# Patient Record
Sex: MALE | Race: OTHER | HISPANIC OR LATINO | URBAN - METROPOLITAN AREA
[De-identification: names, ages, dates, MRNs, and addresses within clinical notes are randomized per-mention and may not be internally consistent; named-entity substitution may affect disease eponyms.]

---

## 2017-06-30 ENCOUNTER — EMERGENCY (EMERGENCY)
Facility: HOSPITAL | Age: 57
LOS: 1 days | Discharge: HOME | End: 2017-06-30
Attending: EMERGENCY MEDICINE | Admitting: EMERGENCY MEDICINE
Payer: COMMERCIAL

## 2017-06-30 VITALS
OXYGEN SATURATION: 97 % | RESPIRATION RATE: 20 BRPM | WEIGHT: 182.98 LBS | HEIGHT: 69 IN | HEART RATE: 74 BPM | TEMPERATURE: 98 F | SYSTOLIC BLOOD PRESSURE: 113 MMHG | DIASTOLIC BLOOD PRESSURE: 73 MMHG

## 2017-06-30 LAB
ALBUMIN SERPL ELPH-MCNC: 4.2 G/DL — SIGNIFICANT CHANGE UP (ref 3.3–5.2)
ALP SERPL-CCNC: 93 U/L — SIGNIFICANT CHANGE UP (ref 40–120)
ALT FLD-CCNC: 19 U/L — SIGNIFICANT CHANGE UP
ANION GAP SERPL CALC-SCNC: 14 MMOL/L — SIGNIFICANT CHANGE UP (ref 5–17)
AST SERPL-CCNC: 21 U/L — SIGNIFICANT CHANGE UP
BASOPHILS # BLD AUTO: 0 K/UL — SIGNIFICANT CHANGE UP (ref 0–0.2)
BASOPHILS NFR BLD AUTO: 0.3 % — SIGNIFICANT CHANGE UP (ref 0–2)
BILIRUB SERPL-MCNC: 0.5 MG/DL — SIGNIFICANT CHANGE UP (ref 0.4–2)
BUN SERPL-MCNC: 10 MG/DL — SIGNIFICANT CHANGE UP (ref 8–20)
CALCIUM SERPL-MCNC: 8.9 MG/DL — SIGNIFICANT CHANGE UP (ref 8.6–10.2)
CHLORIDE SERPL-SCNC: 104 MMOL/L — SIGNIFICANT CHANGE UP (ref 98–107)
CO2 SERPL-SCNC: 22 MMOL/L — SIGNIFICANT CHANGE UP (ref 22–29)
CREAT SERPL-MCNC: 0.89 MG/DL — SIGNIFICANT CHANGE UP (ref 0.5–1.3)
EOSINOPHIL # BLD AUTO: 0.3 K/UL — SIGNIFICANT CHANGE UP (ref 0–0.5)
EOSINOPHIL NFR BLD AUTO: 4.4 % — SIGNIFICANT CHANGE UP (ref 0–6)
GLUCOSE SERPL-MCNC: 185 MG/DL — HIGH (ref 70–115)
HCT VFR BLD CALC: 38.6 % — LOW (ref 42–52)
HGB BLD-MCNC: 13.5 G/DL — LOW (ref 14–18)
LYMPHOCYTES # BLD AUTO: 1.6 K/UL — SIGNIFICANT CHANGE UP (ref 1–4.8)
LYMPHOCYTES # BLD AUTO: 25.1 % — SIGNIFICANT CHANGE UP (ref 20–55)
MCHC RBC-ENTMCNC: 30.3 PG — SIGNIFICANT CHANGE UP (ref 27–31)
MCHC RBC-ENTMCNC: 35 G/DL — SIGNIFICANT CHANGE UP (ref 32–36)
MCV RBC AUTO: 86.7 FL — SIGNIFICANT CHANGE UP (ref 80–94)
MONOCYTES # BLD AUTO: 0.4 K/UL — SIGNIFICANT CHANGE UP (ref 0–0.8)
MONOCYTES NFR BLD AUTO: 7 % — SIGNIFICANT CHANGE UP (ref 3–10)
NEUTROPHILS # BLD AUTO: 3.9 K/UL — SIGNIFICANT CHANGE UP (ref 1.8–8)
NEUTROPHILS NFR BLD AUTO: 62.9 % — SIGNIFICANT CHANGE UP (ref 37–73)
PLATELET # BLD AUTO: 241 K/UL — SIGNIFICANT CHANGE UP (ref 150–400)
POTASSIUM SERPL-MCNC: 4.2 MMOL/L — SIGNIFICANT CHANGE UP (ref 3.5–5.3)
POTASSIUM SERPL-SCNC: 4.2 MMOL/L — SIGNIFICANT CHANGE UP (ref 3.5–5.3)
PROT SERPL-MCNC: 6.6 G/DL — SIGNIFICANT CHANGE UP (ref 6.6–8.7)
RBC # BLD: 4.45 M/UL — LOW (ref 4.6–6.2)
RBC # FLD: 13.4 % — SIGNIFICANT CHANGE UP (ref 11–15.6)
SODIUM SERPL-SCNC: 140 MMOL/L — SIGNIFICANT CHANGE UP (ref 135–145)
WBC # BLD: 6.2 K/UL — SIGNIFICANT CHANGE UP (ref 4.8–10.8)
WBC # FLD AUTO: 6.2 K/UL — SIGNIFICANT CHANGE UP (ref 4.8–10.8)

## 2017-06-30 PROCEDURE — 70450 CT HEAD/BRAIN W/O DYE: CPT

## 2017-06-30 PROCEDURE — 80053 COMPREHEN METABOLIC PANEL: CPT

## 2017-06-30 PROCEDURE — 36415 COLL VENOUS BLD VENIPUNCTURE: CPT

## 2017-06-30 PROCEDURE — 85027 COMPLETE CBC AUTOMATED: CPT

## 2017-06-30 PROCEDURE — 99284 EMERGENCY DEPT VISIT MOD MDM: CPT | Mod: 25

## 2017-06-30 PROCEDURE — 96374 THER/PROPH/DIAG INJ IV PUSH: CPT

## 2017-06-30 PROCEDURE — 70450 CT HEAD/BRAIN W/O DYE: CPT | Mod: 26

## 2017-06-30 PROCEDURE — 99284 EMERGENCY DEPT VISIT MOD MDM: CPT

## 2017-06-30 RX ORDER — METOCLOPRAMIDE HCL 10 MG
10 TABLET ORAL ONCE
Qty: 0 | Refills: 0 | Status: COMPLETED | OUTPATIENT
Start: 2017-06-30 | End: 2017-06-30

## 2017-06-30 RX ADMIN — Medication 10 MILLIGRAM(S): at 13:28

## 2017-06-30 NOTE — ED STATDOCS - ATTENDING CONTRIBUTION TO CARE
I, Lisa Yost, performed the initial face to face bedside interview with this patient regarding history of present illness, review of symptoms and relevant past medical, social and family history.  I completed an independent physical examination.  I was the initial provider who evaluated this patient. I have signed out the follow up of any pending tests (i.e. labs, radiological studies) to the ACP.  I have communicated the patient’s plan of care and disposition with the ACP.  The history, relevant review of systems, past medical and surgical history, medical decision making, and physical examination was documented by the scribe in my presence and I attest to the accuracy of the documentation.

## 2017-06-30 NOTE — ED ADULT NURSE NOTE - PAIN: BODY LOCATION
head
Breathing spontaneous and unlabored. Breath sounds clear and equal bilaterally with regular rhythm.

## 2017-06-30 NOTE — ED STATDOCS - MUSCULOSKELETAL, MLM
range of motion is not limited and there is no muscle tenderness. normal gait, no difficulty walking. neck supple.

## 2017-06-30 NOTE — ED STATDOCS - OBJECTIVE STATEMENT
57 y/o M pt with a PMHx of Psoriasis, HTN, Hyperlipidemia and DM presents to the ED c/o headache for 3 weeks. He states that the headache is worse in the morning than in the evening. He reports that he gets headaches sometimes, but not extremely frequently. He also reports that there have been no changes in medications. Woke up at 09:20 (06/30/17) with no pain, and it worsened throughout the day. Denies blurry vision, change in vision, n/v, fever, chills or any other complaints. 57 y/o M pt with a PMHx of Psoriasis, HTN, Hyperlipidemia and DM presents to the ED c/o headache for 3 weeks. He states that the headache is worse in the morning than in the evening. He reports that he gets headaches sometimes, but not extremely frequently. He also reports that there have been no changes in medications. Woke up at 09:20 (06/30/17) with no pain, and it worsened throughout the day. Denies blurry vision, change in vision, n/v, fever, chills, numbness, tingling, weakness, or any other complaints.

## 2017-06-30 NOTE — ED STATDOCS - PROGRESS NOTE DETAILS
Patient presents c/o intermittent ha over past 2 weeks, patient states happens in AM and at work, no visual disturbances, no n/v/d, patient with ha currently.  Patient in room comfortable in NAD.  Gait steady, PERRL, CN 2-12 intact, strength 5/5 bilaterally.  CT ordered by attending along with medication, will follow up and discuss with attending patient states pain has resolved,

## 2017-06-30 NOTE — ED STATDOCS - MEDICAL DECISION MAKING DETAILS
Will get labs, CT scan head to rule out intercranial hemorrhage. Likely out patient neurological follow up.

## 2017-06-30 NOTE — ED ADULT NURSE NOTE - OBJECTIVE STATEMENT
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 HR Regular alert and oriented x 3, LSCTA.

## 2018-01-23 ENCOUNTER — EMERGENCY (EMERGENCY)
Facility: HOSPITAL | Age: 58
LOS: 1 days | Discharge: HOME | End: 2018-01-23
Attending: STUDENT IN AN ORGANIZED HEALTH CARE EDUCATION/TRAINING PROGRAM | Admitting: STUDENT IN AN ORGANIZED HEALTH CARE EDUCATION/TRAINING PROGRAM
Payer: COMMERCIAL

## 2018-01-23 VITALS
DIASTOLIC BLOOD PRESSURE: 79 MMHG | WEIGHT: 179.9 LBS | OXYGEN SATURATION: 99 % | RESPIRATION RATE: 18 BRPM | SYSTOLIC BLOOD PRESSURE: 143 MMHG | TEMPERATURE: 98 F | HEART RATE: 63 BPM | HEIGHT: 69 IN

## 2018-01-23 VITALS
HEART RATE: 64 BPM | DIASTOLIC BLOOD PRESSURE: 75 MMHG | RESPIRATION RATE: 16 BRPM | SYSTOLIC BLOOD PRESSURE: 120 MMHG | OXYGEN SATURATION: 100 %

## 2018-01-23 PROCEDURE — 99284 EMERGENCY DEPT VISIT MOD MDM: CPT | Mod: 25

## 2018-01-23 PROCEDURE — 70450 CT HEAD/BRAIN W/O DYE: CPT

## 2018-01-23 PROCEDURE — 70450 CT HEAD/BRAIN W/O DYE: CPT | Mod: 26

## 2018-01-23 PROCEDURE — 96372 THER/PROPH/DIAG INJ SC/IM: CPT

## 2018-01-23 RX ORDER — OXYCODONE AND ACETAMINOPHEN 5; 325 MG/1; MG/1
1 TABLET ORAL ONCE
Qty: 0 | Refills: 0 | Status: DISCONTINUED | OUTPATIENT
Start: 2018-01-23 | End: 2018-01-23

## 2018-01-23 RX ORDER — KETOROLAC TROMETHAMINE 30 MG/ML
60 SYRINGE (ML) INJECTION ONCE
Qty: 0 | Refills: 0 | Status: DISCONTINUED | OUTPATIENT
Start: 2018-01-23 | End: 2018-01-23

## 2018-01-23 RX ORDER — FOLIC ACID 0.8 MG
0 TABLET ORAL
Qty: 0 | Refills: 0 | COMMUNITY

## 2018-01-23 RX ORDER — AZITHROMYCIN 500 MG/1
0 TABLET, FILM COATED ORAL
Qty: 0 | Refills: 0 | COMMUNITY

## 2018-01-23 RX ORDER — METFORMIN HYDROCHLORIDE 850 MG/1
1 TABLET ORAL
Qty: 0 | Refills: 0 | COMMUNITY

## 2018-01-23 RX ORDER — OMEPRAZOLE 10 MG/1
1 CAPSULE, DELAYED RELEASE ORAL
Qty: 0 | Refills: 0 | COMMUNITY

## 2018-01-23 RX ADMIN — OXYCODONE AND ACETAMINOPHEN 1 TABLET(S): 5; 325 TABLET ORAL at 04:13

## 2018-01-23 RX ADMIN — OXYCODONE AND ACETAMINOPHEN 1 TABLET(S): 5; 325 TABLET ORAL at 06:49

## 2018-01-23 RX ADMIN — Medication 60 MILLIGRAM(S): at 04:13

## 2018-01-23 RX ADMIN — Medication 60 MILLIGRAM(S): at 06:49

## 2018-01-23 NOTE — ED PROVIDER NOTE - OBJECTIVE STATEMENT
58 y/o M pt with hx of DM, HTN, and HLD presents to ED c/o persistent headache x 1 day. pt has the flu x 1 week, was diagnosed last week by Dr. Abdullahi. Headache worsened by movement. diarrhea and slightly dizzy. had numbness tingling but subsided. Took Advil with no relief and took 6 Tylenol with no relief. No hx of migraines. Denies cough and rhinorrhea. NKDA.  PCP: Dr. Abdullahi

## 2018-01-23 NOTE — ED ADULT NURSE NOTE - OBJECTIVE STATEMENT
Pt received in [facility] c/o 24 hours of persistent TAVERAS. Pt states he was diagnosed with the flu 1 week ago by Dr. Abdullahi and placed on medication for a respiratory infection (Azithromycin). Pt states he has had diarrhea for a few days and that his HA has gotten worse over the last day. Pt denies any nausea, vomiting, cough, chest pain, weakness, numbness/tingling, visual disturbances.

## 2018-08-07 NOTE — ED ADULT NURSE NOTE - ATTEMPT TO OOB
no
< from: 12 Lead ECG (08.07.18 @ 14:25) >    Ventricular Rate 116 BPM    Atrial Rate 115 BPM    QRS Duration 70 ms    Q-T Interval 318 ms    ****QTC Calculation(Bezet) 442 ms*****    R Axis -36 degrees    T Axis -36 degrees    Diagnosis Line Atrial fibrillation with rapid ventricular response  Septal infarct , age undetermined  Nonspecific ST - T abnormalities

## 2019-09-18 NOTE — ED ADULT NURSE NOTE - CHIEF COMPLAINT QUOTE
Health Maintenance Due   Topic Date Due   • Pneumococcal Vaccine 0-64 (1 of 1 - PPSV23) 09/21/1986   • Influenza Vaccine (1) 09/01/2019       Patient is due for topics as listed above but is not proceeding with Immunization(s) Influenza and Pneumococcal at this time. Patient will discuss vaccines with patient         c/o headache 2 weeks, sent from pmd for eval

## 2019-11-27 NOTE — ED STATDOCS - NS ED MD EM SELECTION
[FreeTextEntry1] : CKD stage 3 stable with normal electrolytes likely secondary to HTN nephrosclerosis;  however anemia raising suspicion to Paraproteinemic dz. ( although iron def could be separate due to hemorrhoids). \par - send labs for Anemia panel.\par -Protein./cr ratio\par -SPEP/IPEP\par -avoid NSAIDS\par -Continue with Meds for BP ; well controlled\par -gout only prednisone for acute attacks. Allopurinol being managed by Rheum. \par -Walt reviewed \par \par RTC in 4 months 
[id number] Detailed

## 2021-02-03 ENCOUNTER — EMERGENCY (EMERGENCY)
Facility: HOSPITAL | Age: 61
LOS: 1 days | Discharge: HOME | End: 2021-02-03
Payer: COMMERCIAL

## 2021-02-03 ENCOUNTER — TRANSCRIPTION ENCOUNTER (OUTPATIENT)
Age: 61
End: 2021-02-03

## 2021-02-03 VITALS — RESPIRATION RATE: 18 BRPM | HEIGHT: 69 IN | HEART RATE: 92 BPM | OXYGEN SATURATION: 100 % | TEMPERATURE: 98 F

## 2021-02-03 PROBLEM — I10 ESSENTIAL (PRIMARY) HYPERTENSION: Chronic | Status: ACTIVE | Noted: 2017-07-03

## 2021-02-03 PROBLEM — E78.5 HYPERLIPIDEMIA, UNSPECIFIED: Chronic | Status: ACTIVE | Noted: 2017-07-03

## 2021-02-03 PROBLEM — E11.9 TYPE 2 DIABETES MELLITUS WITHOUT COMPLICATIONS: Chronic | Status: ACTIVE | Noted: 2017-07-03

## 2021-02-03 PROBLEM — L40.9 PSORIASIS, UNSPECIFIED: Chronic | Status: ACTIVE | Noted: 2017-07-03

## 2021-02-03 LAB — SARS-COV-2 RNA SPEC QL NAA+PROBE: SIGNIFICANT CHANGE UP

## 2021-02-03 PROCEDURE — 99282 EMERGENCY DEPT VISIT SF MDM: CPT

## 2021-02-03 PROCEDURE — 99283 EMERGENCY DEPT VISIT LOW MDM: CPT

## 2021-02-03 PROCEDURE — U0005: CPT

## 2021-02-03 PROCEDURE — U0003: CPT

## 2021-02-03 NOTE — ED PROVIDER NOTE - OBJECTIVE STATEMENT
COVID ASYMPTOMATIC SWAB  Pt presenting to the ER for COVID-19 testing. Denies fevers chills, loss of taste or smell, URI symptoms, chest pain or shortness of breath, nausea vomiting diarrhea abdominal pain, weakness or fatigue. Eating and drinking normal diet. Normal output. Pt requesting testing at this time.

## 2021-02-03 NOTE — ED PROVIDER NOTE - PATIENT PORTAL LINK FT
You can access the FollowMyHealth Patient Portal offered by [facility] by registering at the following website: [email or web address]. By joining [employer]’s FollowMyHealth portal, you will also be able to view your health information using other applications (apps) compatible with our system.

## 2022-11-04 ENCOUNTER — OFFICE (OUTPATIENT)
Dept: URBAN - METROPOLITAN AREA CLINIC 115 | Facility: CLINIC | Age: 62
Setting detail: OPHTHALMOLOGY
End: 2022-11-04
Payer: COMMERCIAL

## 2022-11-04 DIAGNOSIS — H25.13: ICD-10-CM

## 2022-11-04 DIAGNOSIS — H16.223: ICD-10-CM

## 2022-11-04 DIAGNOSIS — H35.033: ICD-10-CM

## 2022-11-04 DIAGNOSIS — H52.4: ICD-10-CM

## 2022-11-04 DIAGNOSIS — E11.9: ICD-10-CM

## 2022-11-04 DIAGNOSIS — H11.043: ICD-10-CM

## 2022-11-04 PROCEDURE — 92015 DETERMINE REFRACTIVE STATE: CPT | Performed by: OPHTHALMOLOGY

## 2022-11-04 PROCEDURE — 92014 COMPRE OPH EXAM EST PT 1/>: CPT | Performed by: OPHTHALMOLOGY

## 2022-11-04 PROCEDURE — 92202 OPSCPY EXTND ON/MAC DRAW: CPT | Performed by: OPHTHALMOLOGY

## 2022-11-04 ASSESSMENT — REFRACTION_AUTOREFRACTION
OS_AXIS: 099
OD_CYLINDER: -1.50
OS_CYLINDER: -1.00
OD_SPHERE: +0.25
OD_AXIS: 084
OS_SPHERE: PLANO

## 2022-11-04 ASSESSMENT — REFRACTION_MANIFEST
OD_VA1: 20/20
OS_VA1: 20/20
OD_CYLINDER: -1.25
OS_SPHERE: PLANO
OD_ADD: +3.00
OD_SPHERE: +0.25
OS_CYLINDER: -1.00
OS_ADD: +3.00
OU_VA: 20/20
OD_AXIS: 084
OS_AXIS: 099

## 2022-11-04 ASSESSMENT — CORNEAL PTERYGIUM
OD_PTERYGIUM: NASAL 1MM
OS_PTERYGIUM: NASAL 1MM

## 2022-11-04 ASSESSMENT — TONOMETRY
OS_IOP_MMHG: 17
OD_IOP_MMHG: 18

## 2022-11-04 ASSESSMENT — SPHEQUIV_DERIVED
OD_SPHEQUIV: -0.5
OD_SPHEQUIV: -0.375

## 2022-11-04 ASSESSMENT — SUPERFICIAL PUNCTATE KERATITIS (SPK)
OD_SPK: 1+
OS_SPK: 1+

## 2022-11-04 ASSESSMENT — CONFRONTATIONAL VISUAL FIELD TEST (CVF)
OD_FINDINGS: FULL
OS_FINDINGS: FULL

## 2022-11-04 ASSESSMENT — VISUAL ACUITY
OS_BCVA: 20/25
OD_BCVA: 20/20-1

## 2023-01-18 NOTE — ED ADULT TRIAGE NOTE - WEIGHT IN LBS
Alesha Briones was seen and evaluated today in the emergency department over your concern of left ankle pain following a fall  You refused left ankle xray  Please return to the emergency department if you experience worsen or any other signs and symptoms that may be concerning to you  Please follow-up with your primary care doctor within 1 day  All questions were answered prior to discharge  Thank you for choosing [facility] for your care  [phone]

## 2023-02-14 NOTE — ED ADULT NURSE NOTE - NS ED NURSE DC INFO COMPLEXITY
Izzy Song Physician [facility]  CARDIOLOGY [address]  Scheduled Appointment: 02/22/2023    
Straightforward: Basic instructions, no meds, no home treatment

## 2023-03-09 ENCOUNTER — LABORATORY RESULT (OUTPATIENT)
Age: 63
End: 2023-03-09

## 2023-03-09 ENCOUNTER — APPOINTMENT (OUTPATIENT)
Dept: RHEUMATOLOGY | Facility: CLINIC | Age: 63
End: 2023-03-09
Payer: COMMERCIAL

## 2023-03-09 VITALS
HEART RATE: 88 BPM | SYSTOLIC BLOOD PRESSURE: 134 MMHG | DIASTOLIC BLOOD PRESSURE: 84 MMHG | TEMPERATURE: 98.4 F | OXYGEN SATURATION: 98 %

## 2023-03-09 PROCEDURE — 99204 OFFICE O/P NEW MOD 45 MIN: CPT

## 2023-03-09 NOTE — ASSESSMENT
[FreeTextEntry1] : Patient with severe PSA and psoriasis previously treated with remicade and MTX \par \par --patient is to do lab work today \par --call in 3-4 days for results and to start medication \par --will obtain documents from prior rheumatologist \par --will need to consider biologic \par

## 2023-03-09 NOTE — PHYSICAL EXAM
[General Appearance - Well Nourished] : well nourished [General Appearance - Well Developed] : well developed [Sclera] : the sclera and conjunctiva were normal [Hearing Threshold Finger Rub Not [place]] : hearing was normal [Nail Clubbing] : no clubbing  or cyanosis of the fingernails [Motor Tone] : muscle strength and tone were normal [FreeTextEntry1] : psoriasis in legs, arms, back  [Affect] : the affect was normal [Mood] : the mood was normal

## 2023-03-09 NOTE — HISTORY OF PRESENT ILLNESS
[FreeTextEntry1] : 61 yo man with history of DM, HTN, HLD, rhinitis, psoriasis and PSA.  presents for management of psoriasis and PSA.  patient previously followed by DR Quinn James who was treating him  with Remicade and MTX.  Patient last seen by Dr James was in 4/2021.  \par \par Patient states that his psoriasis has worsen and he has significant joint pain and joint swelling.  his has prolong morning stiffness.  he does feel better when he becomes active and states active .  however he sometimes finds it too difficulty to get himself started.  He has left foot sauage digits ( digit 3-5) , ankle swelling which makes it difficult to walk, left wrist swelling and cystic formation over the dorsum hand/wrist.  \par \par he denies any eye issues, oral lesion, GI issues.  No sob/cough or CP.  \par \par PMH: as above\par surgery: appendectomy, rotator cuff surgery to bilateral shoulders \par all: NKDA\par Meds;enalopril, rosuvastatin, cetirizine, methotrexate, piogliotazone \par SH: From , retired use to work in a [employer] doing heavy lifting ,, No smoking or illicit drugs, ++alcohol on the weekends  \par

## 2023-03-23 ENCOUNTER — APPOINTMENT (OUTPATIENT)
Dept: RHEUMATOLOGY | Facility: CLINIC | Age: 63
End: 2023-03-23
Payer: COMMERCIAL

## 2023-03-23 VITALS
OXYGEN SATURATION: 98 % | WEIGHT: 183 LBS | TEMPERATURE: 97.2 F | HEIGHT: 71 IN | DIASTOLIC BLOOD PRESSURE: 84 MMHG | RESPIRATION RATE: 17 BRPM | HEART RATE: 77 BPM | BODY MASS INDEX: 25.62 KG/M2 | SYSTOLIC BLOOD PRESSURE: 160 MMHG

## 2023-03-23 LAB
ALBUMIN SERPL ELPH-MCNC: 4.4 G/DL
ALP BLD-CCNC: 129 U/L
ALT SERPL-CCNC: 14 U/L
ANA SER IF-ACNC: NEGATIVE
ANION GAP SERPL CALC-SCNC: 14 MMOL/L
AST SERPL-CCNC: 15 U/L
BASOPHILS # BLD AUTO: 0.03 K/UL
BASOPHILS NFR BLD AUTO: 0.3 %
BILIRUB SERPL-MCNC: 0.3 MG/DL
BUN SERPL-MCNC: 10 MG/DL
CALCIUM SERPL-MCNC: 10 MG/DL
CCP AB SER IA-ACNC: <8 UNITS
CHLORIDE SERPL-SCNC: 101 MMOL/L
CK SERPL-CCNC: 55 U/L
CO2 SERPL-SCNC: 23 MMOL/L
CREAT SERPL-MCNC: 0.91 MG/DL
CRP SERPL-MCNC: 24 MG/L
EGFR: 95 ML/MIN/1.73M2
EOSINOPHIL # BLD AUTO: 0.22 K/UL
EOSINOPHIL NFR BLD AUTO: 2.4 %
ERYTHROCYTE [SEDIMENTATION RATE] IN BLOOD BY WESTERGREN METHOD: 88 MM/HR
GLUCOSE SERPL-MCNC: 149 MG/DL
HBV CORE IGG+IGM SER QL: NONREACTIVE
HBV SURFACE AB SER QL: NONREACTIVE
HBV SURFACE AG SER QL: NONREACTIVE
HCT VFR BLD CALC: 41 %
HCV AB SER QL: NONREACTIVE
HCV S/CO RATIO: 0.47 S/CO
HGB BLD-MCNC: 13.8 G/DL
HIV1+2 AB SPEC QL IA.RAPID: NONREACTIVE
IMM GRANULOCYTES NFR BLD AUTO: 0.3 %
LYMPHOCYTES # BLD AUTO: 1.91 K/UL
LYMPHOCYTES NFR BLD AUTO: 20.8 %
M TB IFN-G BLD-IMP: NEGATIVE
MAN DIFF?: NORMAL
MCHC RBC-ENTMCNC: 29 PG
MCHC RBC-ENTMCNC: 33.7 GM/DL
MCV RBC AUTO: 86.1 FL
MONOCYTES # BLD AUTO: 0.74 K/UL
MONOCYTES NFR BLD AUTO: 8 %
NEUTROPHILS # BLD AUTO: 6.27 K/UL
NEUTROPHILS NFR BLD AUTO: 68.2 %
PLATELET # BLD AUTO: 356 K/UL
POTASSIUM SERPL-SCNC: 4.8 MMOL/L
PROT SERPL-MCNC: 7.3 G/DL
QUANTIFERON TB PLUS MITOGEN MINUS NIL: >10 IU/ML
QUANTIFERON TB PLUS NIL: 0.02 IU/ML
QUANTIFERON TB PLUS TB1 MINUS NIL: 0 IU/ML
QUANTIFERON TB PLUS TB2 MINUS NIL: 0.01 IU/ML
RBC # BLD: 4.76 M/UL
RBC # FLD: 14.9 %
RF+CCP IGG SER-IMP: NEGATIVE
RHEUMATOID FACT SER QL: <10 IU/ML
SODIUM SERPL-SCNC: 138 MMOL/L
WBC # FLD AUTO: 9.2 K/UL

## 2023-03-23 PROCEDURE — 99214 OFFICE O/P EST MOD 30 MIN: CPT

## 2023-03-23 NOTE — ASSESSMENT
[FreeTextEntry1] : Patient with severe PSA and psoriasis previously treated with remicade and MTX.  Currently in a severe flare  \par \par --start HUmira \par --restart MTX with FA \par --drug monitoring labs prior to next visit \par --xrays on next visit \par

## 2023-03-23 NOTE — HISTORY OF PRESENT ILLNESS
[FreeTextEntry1] : 63 yo man with history of DM, HTN, HLD, rhinitis, psoriasis and PSA.  presents for management of psoriasis and PSA.  patient previously followed by DR Quinn James who was treating him with Remicade and MTX.  Patient last seen by Dr James was in 4/2021.  \par \par Patient states that his psoriasis has worsen and he has significant joint pain and joint swelling.  his has prolong morning stiffness.  he does feel better when he becomes active and states active .  however he sometimes finds it too difficulty to get himself started.  He has left foot sauage digits ( digit 3-5) , ankle swelling which makes it difficult to walk, left wrist swelling and cystic formation over the dorsum hand/wrist.  \par \par he denies any eye issues, oral lesion, GI issues.  No sob/cough or CP.  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

## 2023-03-23 NOTE — PHYSICAL EXAM
[General Appearance - Well Nourished] : well nourished [General Appearance - Well Developed] : well developed [Sclera] : the sclera and conjunctiva were normal [Hearing Threshold Finger Rub Not [place]] : hearing was normal [Nail Clubbing] : no clubbing  or cyanosis of the fingernails [Motor Tone] : muscle strength and tone were normal [FreeTextEntry1] : psoriasis in legs, arms, back  [Affect] : the affect was normal [Mood] : the mood was normal

## 2023-03-31 ENCOUNTER — APPOINTMENT (OUTPATIENT)
Dept: RHEUMATOLOGY | Facility: CLINIC | Age: 63
End: 2023-03-31

## 2023-03-31 VITALS
OXYGEN SATURATION: 96 % | TEMPERATURE: 98.4 F | SYSTOLIC BLOOD PRESSURE: 138 MMHG | RESPIRATION RATE: 18 BRPM | HEART RATE: 85 BPM | DIASTOLIC BLOOD PRESSURE: 77 MMHG

## 2023-04-28 ENCOUNTER — APPOINTMENT (OUTPATIENT)
Dept: RHEUMATOLOGY | Facility: CLINIC | Age: 63
End: 2023-04-28
Payer: COMMERCIAL

## 2023-04-28 VITALS
DIASTOLIC BLOOD PRESSURE: 65 MMHG | HEART RATE: 84 BPM | TEMPERATURE: 98.4 F | OXYGEN SATURATION: 98 % | SYSTOLIC BLOOD PRESSURE: 120 MMHG

## 2023-04-28 PROCEDURE — 99214 OFFICE O/P EST MOD 30 MIN: CPT

## 2023-04-28 RX ORDER — FOLIC ACID 1 MG/1
1 TABLET ORAL DAILY
Qty: 30 | Refills: 3 | Status: ACTIVE | COMMUNITY
Start: 2023-03-23 | End: 1900-01-01

## 2023-04-28 NOTE — HISTORY OF PRESENT ILLNESS
[FreeTextEntry1] : 61 yo man with history of DM, HTN, HLD, alcohol use, rhinitis, psoriasis and PSA.  presents for management of psoriasis and PSA.  patient previously followed by DR Quinn James who was treating him with Remicade and MTX.  Patient last seen by Dr James was in 4/2021.  \par \par On last visit : Patient states that his psoriasis has worsen and he has significant joint pain and joint swelling.  his has prolong morning stiffness.  he does feel better when he becomes active and stays active .  however he sometimes finds it too difficulty to get himself started.  He has left foot sausage digits ( digit 3-5) , ankle swelling which makes it difficult to walk, left wrist swelling and cystic formation over the dorsum hand/wrist.  \par \par TODAY: patient started humira 1 month ago and feels much better.  he is also on MTX and he denies any alcohol use.  PATient states that skin is healing and his joints feel much better.  he denies any back pain or cervical at this time.  No more sauage digits.  denies any eye isses, cough or sob.  \par \par he denies any eye issues, oral lesion, GI issues.  No sob/cough or CP.  \par \par Labs: \par quantiferon/hep serologies/HIV 3/9/2023\par ESR 88\par CRP 24 \par normal cbc/cmp\par negative RF/CCP/HLAB27/LEI\par normal CPK \par \par PMH: as above\par surgery: appendectomy, rotator cuff surgery to bilateral shoulders \par all: NKDA\par Meds;enalopril, rosuvastatin, cetirizine, methotrexate, piogliotazone \par SH: From , retired use to work in a [employer] doing heavy lifting ,, No smoking or illicit drugs, ++alcohol on the weekends  \par

## 2023-04-28 NOTE — PHYSICAL EXAM
[General Appearance - Well Nourished] : well nourished [General Appearance - Well Developed] : well developed [Sclera] : the sclera and conjunctiva were normal [Hearing Threshold Finger Rub Not [place]] : hearing was normal [Nail Clubbing] : no clubbing  or cyanosis of the fingernails [Musculoskeletal - Swelling] : no joint swelling seen [Motor Tone] : muscle strength and tone were normal [FreeTextEntry1] : psoriasis  [Affect] : the affect was normal [Mood] : the mood was normal

## 2023-04-28 NOTE — ASSESSMENT
[FreeTextEntry1] : Patient with severe PSA and psoriasis previously treated with remicade and MTX.  started on HUmira 6 weeks ago and doing much better \par \par --cont HUmira \par --cont MTX with FA \par --drug monitoring labs prior to next visit \par --xrays on next visit \par --triamcinolone  for psoriasis \par --avoid alcohol

## 2023-05-05 ENCOUNTER — OFFICE (OUTPATIENT)
Dept: URBAN - METROPOLITAN AREA CLINIC 115 | Facility: CLINIC | Age: 63
Setting detail: OPHTHALMOLOGY
End: 2023-05-05
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
## 2023-05-05 PROCEDURE — NO SHOW FE NO SHOW FEE: Performed by: OPHTHALMOLOGY

## 2023-06-19 ENCOUNTER — APPOINTMENT (OUTPATIENT)
Dept: GASTROENTEROLOGY | Facility: CLINIC | Age: 63
End: 2023-06-19
Payer: COMMERCIAL

## 2023-06-19 VITALS
OXYGEN SATURATION: 98 % | DIASTOLIC BLOOD PRESSURE: 81 MMHG | WEIGHT: 190 LBS | RESPIRATION RATE: 14 BRPM | SYSTOLIC BLOOD PRESSURE: 147 MMHG | BODY MASS INDEX: 26.6 KG/M2 | HEART RATE: 79 BPM | HEIGHT: 71 IN | TEMPERATURE: 98 F

## 2023-06-19 PROCEDURE — 99204 OFFICE O/P NEW MOD 45 MIN: CPT

## 2023-06-19 RX ORDER — ASPIRIN 81 MG
81 TABLET, DELAYED RELEASE (ENTERIC COATED) ORAL
Refills: 0 | Status: ACTIVE | COMMUNITY

## 2023-06-19 RX ORDER — POLYETHYLENE GLYCOL-3350 AND ELECTROLYTES WITH FLAVOR PACK 240; 5.84; 2.98; 6.72; 22.72 G/278.26G; G/278.26G; G/278.26G; G/278.26G; G/278.26G
240 POWDER, FOR SOLUTION ORAL
Qty: 1 | Refills: 0 | Status: ACTIVE | COMMUNITY
Start: 2023-06-19 | End: 1900-01-01

## 2023-06-19 RX ORDER — ROSUVASTATIN CALCIUM 5 MG/1
5 TABLET, FILM COATED ORAL
Refills: 0 | Status: ACTIVE | COMMUNITY

## 2023-06-19 RX ORDER — ENALAPRIL MALEATE 10 MG/1
10 TABLET ORAL
Refills: 0 | Status: ACTIVE | COMMUNITY

## 2023-06-19 RX ORDER — TRIAMCINOLONE ACETONIDE 1 MG/G
0.1 CREAM TOPICAL 3 TIMES DAILY
Qty: 1 | Refills: 2 | Status: DISCONTINUED | COMMUNITY
Start: 2023-04-28 | End: 2023-06-19

## 2023-06-19 NOTE — ASSESSMENT
[FreeTextEntry1] : ARLEEN QUINTANA is a 62 year old male who presents today with c/o heartburn and acid regurgitation, 2-3 times per week for the last 1 month. Lifestyle modifications discussed - decreased intake of acidic, citrus, spicy, greasy and fried foods, chocolate, caffeine, alcohol, and carbonated beverages, elevate head of bed at bedtime, avoid eating 2-3 hours prior to laying down/sleep, consider eating small, frequent meals. Continue Omeprazole daily. Will also need upper endoscopy to further evaluate.\par \par Pt has a personal history of colon polyps with last colonoscopy being 5 years ago w/[facility]. We will request records from that office to review. Pt will need repeat colonoscopy for continued polyp surveillance.\par \par Endoscopy and Colonoscopy to be scheduled. Endoscopy to r/o gastritis, esophagitis, Andersen's Esophagus or PUD. Colonoscopy to r/o malignancy. I have discussed the indications, risks and benefits of the procedures with patient including missed lesion. Bowel prep instructions discussed at length. Alternatives to the endoscopy and colonoscopy discussed with patient. All questions were answered. The patient agrees to proceed with the endoscopy and colonoscopy. Patient is medically optimized for these procedures. Labs reviewed. Gavilyte prep sent to pharmacy.

## 2023-06-19 NOTE — HISTORY OF PRESENT ILLNESS
[FreeTextEntry1] : ARLEEN QUINTANA is a 62 year old male with a PMH significant for HTN, HLD, Arthritis and Psoriasis.\par \par He presents today with complaints of heartburn and acid regurgitation, 2-3 times per week for the last 1 month. Symptoms are worse at night. Denies abdominal pain, nausea, vomiting or dysphagia. He was started on Omeprazole daily about 2 weeks ago by his PCP which has helped symptoms but not completely resolved. Pt does not recall the dosage she is taking.\par \par Pt reports a personal history of colon polyps. Last colonoscopy was 5  years ago. We do not have those records to review. He has 1 BM/day. Denies abdominal pain, constipation, diarrhea, rectal bleeding, melena, black stools or unintentional weight loss. Denies family history of colon cancer or colon polyps.\par \par Denies any significant pulmonary or cardiac conditions.

## 2023-06-19 NOTE — PHYSICAL EXAM

## 2023-06-19 NOTE — REASON FOR VISIT
[Consultation] : a consultation visit [[employer] ] : provided by [employer]   [Time Spent: ____ minutes] : Total time spent using  services: [unfilled] minutes. The patient's primary language is not English thus required  services. [FreeTextEntry1] : heartburn, history of colon polyps  [Interpreters_IDNumber] : [id number] [TWNoteComboBox1] : [place]

## 2023-06-30 ENCOUNTER — APPOINTMENT (OUTPATIENT)
Dept: RHEUMATOLOGY | Facility: CLINIC | Age: 63
End: 2023-06-30

## 2023-08-07 ENCOUNTER — TRANSCRIPTION ENCOUNTER (OUTPATIENT)
Age: 63
End: 2023-08-07

## 2023-08-07 RX ORDER — POLYETHYLENE GLYCOL-3350 AND ELECTROLYTES WITH FLAVOR PACK 240; 5.84; 2.98; 6.72; 22.72 G/278.26G; G/278.26G; G/278.26G; G/278.26G; G/278.26G
240 POWDER, FOR SOLUTION ORAL
Qty: 1 | Refills: 0 | Status: ACTIVE | COMMUNITY
Start: 2023-08-07 | End: 1900-01-01

## 2023-08-08 ENCOUNTER — OUTPATIENT (OUTPATIENT)
Dept: OUTPATIENT SERVICES | Facility: HOSPITAL | Age: 63
LOS: 1 days | End: 2023-08-08
Payer: COMMERCIAL

## 2023-08-08 ENCOUNTER — RESULT REVIEW (OUTPATIENT)
Age: 63
End: 2023-08-08

## 2023-08-08 ENCOUNTER — APPOINTMENT (OUTPATIENT)
Dept: GASTROENTEROLOGY | Facility: GI CENTER | Age: 63
End: 2023-08-08
Payer: COMMERCIAL

## 2023-08-08 DIAGNOSIS — K21.9 GASTRO-ESOPHAGEAL REFLUX DISEASE W/OUT ESOPHAGITIS: ICD-10-CM

## 2023-08-08 DIAGNOSIS — K21.9 GASTRO-ESOPHAGEAL REFLUX DISEASE WITHOUT ESOPHAGITIS: ICD-10-CM

## 2023-08-08 DIAGNOSIS — L40.9 PSORIASIS, UNSPECIFIED: ICD-10-CM

## 2023-08-08 DIAGNOSIS — Z86.010 PERSONAL HISTORY OF COLONIC POLYPS: ICD-10-CM

## 2023-08-08 LAB — GLUCOSE BLDC GLUCOMTR-MCNC: 125 MG/DL — HIGH (ref 70–99)

## 2023-08-08 PROCEDURE — G0105: CPT

## 2023-08-08 PROCEDURE — 88342 IMHCHEM/IMCYTCHM 1ST ANTB: CPT

## 2023-08-08 PROCEDURE — 43239 EGD BIOPSY SINGLE/MULTIPLE: CPT

## 2023-08-08 PROCEDURE — 45378 DIAGNOSTIC COLONOSCOPY: CPT | Mod: 33,59

## 2023-08-08 PROCEDURE — 88312 SPECIAL STAINS GROUP 1: CPT | Mod: 26

## 2023-08-08 PROCEDURE — 88305 TISSUE EXAM BY PATHOLOGIST: CPT | Mod: 26

## 2023-08-08 PROCEDURE — 88342 IMHCHEM/IMCYTCHM 1ST ANTB: CPT | Mod: 26

## 2023-08-08 PROCEDURE — 82962 GLUCOSE BLOOD TEST: CPT

## 2023-08-08 PROCEDURE — 88312 SPECIAL STAINS GROUP 1: CPT

## 2023-08-08 PROCEDURE — 88305 TISSUE EXAM BY PATHOLOGIST: CPT

## 2023-08-08 RX ORDER — OMEPRAZOLE 40 MG/1
40 CAPSULE, DELAYED RELEASE ORAL
Qty: 30 | Refills: 5 | Status: ACTIVE | COMMUNITY
Start: 2023-08-08 | End: 1900-01-01

## 2023-08-08 NOTE — ASSESSMENT
[FreeTextEntry1] : The previous encounters have been reviewed and there are no significant changes other those those items which were stated above. The patient's current medical status is stable for the procedure(s) to be performed today.

## 2023-08-08 NOTE — PHYSICAL EXAM

## 2023-08-11 LAB — SURGICAL PATHOLOGY STUDY: SIGNIFICANT CHANGE UP

## 2023-12-11 ENCOUNTER — APPOINTMENT (OUTPATIENT)
Dept: RHEUMATOLOGY | Facility: CLINIC | Age: 63
End: 2023-12-11

## 2024-03-09 ENCOUNTER — EMERGENCY (EMERGENCY)
Facility: HOSPITAL | Age: 64
LOS: 1 days | Discharge: HOME | End: 2024-03-09
Attending: EMERGENCY MEDICINE
Payer: COMMERCIAL

## 2024-03-09 VITALS
DIASTOLIC BLOOD PRESSURE: 79 MMHG | SYSTOLIC BLOOD PRESSURE: 126 MMHG | OXYGEN SATURATION: 99 % | HEART RATE: 98 BPM | RESPIRATION RATE: 19 BRPM | TEMPERATURE: 98 F

## 2024-03-09 VITALS
RESPIRATION RATE: 18 BRPM | DIASTOLIC BLOOD PRESSURE: 92 MMHG | TEMPERATURE: 98 F | SYSTOLIC BLOOD PRESSURE: 137 MMHG | OXYGEN SATURATION: 98 % | HEART RATE: 112 BPM | WEIGHT: 159.39 LBS

## 2024-03-09 LAB
ALBUMIN SERPL ELPH-MCNC: 4.7 G/DL — SIGNIFICANT CHANGE UP (ref 3.3–5.2)
ALP SERPL-CCNC: 122 U/L — HIGH (ref 40–120)
ALT FLD-CCNC: 35 U/L — SIGNIFICANT CHANGE UP
ANION GAP SERPL CALC-SCNC: 20 MMOL/L — HIGH (ref 5–17)
AST SERPL-CCNC: 28 U/L — SIGNIFICANT CHANGE UP
BASOPHILS # BLD AUTO: 0.02 K/UL — SIGNIFICANT CHANGE UP (ref 0–0.2)
BASOPHILS NFR BLD AUTO: 0.2 % — SIGNIFICANT CHANGE UP (ref 0–2)
BILIRUB SERPL-MCNC: 0.5 MG/DL — SIGNIFICANT CHANGE UP (ref 0.4–2)
BUN SERPL-MCNC: 16.9 MG/DL — SIGNIFICANT CHANGE UP (ref 8–20)
CALCIUM SERPL-MCNC: 9.5 MG/DL — SIGNIFICANT CHANGE UP (ref 8.4–10.5)
CHLORIDE SERPL-SCNC: 99 MMOL/L — SIGNIFICANT CHANGE UP (ref 96–108)
CO2 SERPL-SCNC: 19 MMOL/L — LOW (ref 22–29)
CREAT SERPL-MCNC: 1.93 MG/DL — HIGH (ref 0.5–1.3)
EGFR: 38 ML/MIN/1.73M2 — LOW
EOSINOPHIL # BLD AUTO: 0.1 K/UL — SIGNIFICANT CHANGE UP (ref 0–0.5)
EOSINOPHIL NFR BLD AUTO: 1 % — SIGNIFICANT CHANGE UP (ref 0–6)
GLUCOSE SERPL-MCNC: 183 MG/DL — HIGH (ref 70–99)
HCT VFR BLD CALC: 48.8 % — SIGNIFICANT CHANGE UP (ref 39–50)
HGB BLD-MCNC: 16.7 G/DL — SIGNIFICANT CHANGE UP (ref 13–17)
IMM GRANULOCYTES NFR BLD AUTO: 0.2 % — SIGNIFICANT CHANGE UP (ref 0–0.9)
LIDOCAIN IGE QN: 58 U/L — HIGH (ref 22–51)
LYMPHOCYTES # BLD AUTO: 1.09 K/UL — SIGNIFICANT CHANGE UP (ref 1–3.3)
LYMPHOCYTES # BLD AUTO: 11 % — LOW (ref 13–44)
MCHC RBC-ENTMCNC: 30.1 PG — SIGNIFICANT CHANGE UP (ref 27–34)
MCHC RBC-ENTMCNC: 34.2 GM/DL — SIGNIFICANT CHANGE UP (ref 32–36)
MCV RBC AUTO: 88.1 FL — SIGNIFICANT CHANGE UP (ref 80–100)
MONOCYTES # BLD AUTO: 1.08 K/UL — HIGH (ref 0–0.9)
MONOCYTES NFR BLD AUTO: 10.9 % — SIGNIFICANT CHANGE UP (ref 2–14)
NEUTROPHILS # BLD AUTO: 7.59 K/UL — HIGH (ref 1.8–7.4)
NEUTROPHILS NFR BLD AUTO: 76.7 % — SIGNIFICANT CHANGE UP (ref 43–77)
PLATELET # BLD AUTO: 232 K/UL — SIGNIFICANT CHANGE UP (ref 150–400)
POTASSIUM SERPL-MCNC: 4.6 MMOL/L — SIGNIFICANT CHANGE UP (ref 3.5–5.3)
POTASSIUM SERPL-SCNC: 4.6 MMOL/L — SIGNIFICANT CHANGE UP (ref 3.5–5.3)
PROT SERPL-MCNC: 8.3 G/DL — SIGNIFICANT CHANGE UP (ref 6.6–8.7)
RBC # BLD: 5.54 M/UL — SIGNIFICANT CHANGE UP (ref 4.2–5.8)
RBC # FLD: 13.2 % — SIGNIFICANT CHANGE UP (ref 10.3–14.5)
SODIUM SERPL-SCNC: 138 MMOL/L — SIGNIFICANT CHANGE UP (ref 135–145)
WBC # BLD: 9.9 K/UL — SIGNIFICANT CHANGE UP (ref 3.8–10.5)
WBC # FLD AUTO: 9.9 K/UL — SIGNIFICANT CHANGE UP (ref 3.8–10.5)

## 2024-03-09 PROCEDURE — 96375 TX/PRO/DX INJ NEW DRUG ADDON: CPT

## 2024-03-09 PROCEDURE — 99284 EMERGENCY DEPT VISIT MOD MDM: CPT | Mod: 25

## 2024-03-09 PROCEDURE — 85025 COMPLETE CBC W/AUTO DIFF WBC: CPT

## 2024-03-09 PROCEDURE — 80053 COMPREHEN METABOLIC PANEL: CPT

## 2024-03-09 PROCEDURE — 83690 ASSAY OF LIPASE: CPT

## 2024-03-09 PROCEDURE — 36415 COLL VENOUS BLD VENIPUNCTURE: CPT

## 2024-03-09 PROCEDURE — 96374 THER/PROPH/DIAG INJ IV PUSH: CPT

## 2024-03-09 RX ORDER — SODIUM CHLORIDE 9 MG/ML
1000 INJECTION INTRAMUSCULAR; INTRAVENOUS; SUBCUTANEOUS ONCE
Refills: 0 | Status: COMPLETED | OUTPATIENT
Start: 2024-03-09 | End: 2024-03-09

## 2024-03-09 RX ORDER — KETOROLAC TROMETHAMINE 30 MG/ML
15 SYRINGE (ML) INJECTION ONCE
Refills: 0 | Status: DISCONTINUED | OUTPATIENT
Start: 2024-03-09 | End: 2024-03-09

## 2024-03-09 RX ORDER — ONDANSETRON 8 MG/1
4 TABLET, FILM COATED ORAL ONCE
Refills: 0 | Status: COMPLETED | OUTPATIENT
Start: 2024-03-09 | End: 2024-03-09

## 2024-03-09 RX ADMIN — Medication 15 MILLIGRAM(S): at 04:55

## 2024-03-09 RX ADMIN — SODIUM CHLORIDE 1000 MILLILITER(S): 9 INJECTION INTRAMUSCULAR; INTRAVENOUS; SUBCUTANEOUS at 04:55

## 2024-03-09 RX ADMIN — ONDANSETRON 4 MILLIGRAM(S): 8 TABLET, FILM COATED ORAL at 04:55

## 2024-03-09 NOTE — ED ADULT TRIAGE NOTE - CHIEF COMPLAINT QUOTE
Pt walks in for triage after having abdominal pain with diarrhea, nausea and vomiting that has been ongoing since Thursday. Pt has been unable to keep PO intake down without vomiting. Pt denies relief from OTC medications. PMHx of T2DM HTN and endorses taking BP medication.

## 2024-03-09 NOTE — ED PROVIDER NOTE - NSICDXPASTMEDICALHX_GEN_ALL_CORE_FT
PAST MEDICAL HISTORY:  DM (diabetes mellitus)     HTN (hypertension)     Hyperlipidemia     Psoriasis

## 2024-03-09 NOTE — ED ADULT NURSE NOTE - OBJECTIVE STATEMENT
patient presents with complains of nausea, vomiting, diarrhea and abd pain. patient denies fever, hematuria or any other urinary symptoms.

## 2024-03-09 NOTE — ED PROVIDER NOTE - CLINICAL SUMMARY MEDICAL DECISION MAKING FREE TEXT BOX
Diff Dx: gastritis, colitis  Pt has Gi symptoms, pt has contact at home with similar gi symptoms several days prior. Given fluid 1L NS, checking cbc, cmp, lipase. Likely gastritis Diff Dx: gastritis, colitis  Pt has Gi symptoms, pt has contact at home with similar gi symptoms several days prior. Given fluid 1L NS, cbc, cmp, lipase unremarkable. Likely gastritis. Pt passed PO challenge. Pt can be discharged home with instructions to have plenty of fluids.

## 2024-03-09 NOTE — ED ADULT NURSE NOTE - NSSEPSISSUSPECTED_ED_A_ED
Unable to Assess: Patient left before being evaluated Drysol Pregnancy And Lactation Text: This medication is considered safe during pregnancy and breast feeding.

## 2024-03-09 NOTE — ED PROVIDER NOTE - OBJECTIVE STATEMENT
Pt is a 64 yo male with P Pt is a 64 yo male with PMH of DM, HTN, HLD presenting with N/V/D. Pt reports that he has had multiple episodes of nonbloody nonbilious vomiting and diarrhea with associated fever 102F Tmax for 3 days,. Pt reports that his wife at home had similar symptoms earlier this week and was seen at this ED and discharged for viral GI illness. Pt denies cough, SOB, chest pain. Pt is a 64 yo male with PMH of DM, HTN, HLD presenting with N/V/D. Pt reports that he has had multiple episodes of nonbloody nonbilious vomiting and diarrhea with associated fever 102F Tmax for 3 days,. Pt reports that his wife at home had similar symptoms earlier this week and was seen at this ED and discharged for viral GI illness. Pt denies cough, SOB, chest pain, urinary symptoms.

## 2024-03-09 NOTE — ED ADULT TRIAGE NOTE - RESPIRATORY RATE (BREATHS/MIN)
Pt recently went from 3 injectable meds/insulins and oral down to just Glipizide 5mg BID. Since then, her sugars have been 200-300+. This AM was 291 and now it is 330. She is still active and counting carbs. I told pt she might have to go back on Ozempic, if pcp wants, but she really would rather have the insulin pump. I told pt she might not need that much insulin so a pump would might not be indicated. Will discuss with pcp.   18

## 2024-03-09 NOTE — ED PROVIDER NOTE - PATIENT PORTAL LINK FT
You can access the FollowMyHealth Patient Portal offered by [facility] by registering at the following website: [email or web address]. By joining [employer]’s FollowMyHealth portal, you will also be able to view your health information using other applications (apps) compatible with our system.

## 2024-05-09 ENCOUNTER — APPOINTMENT (OUTPATIENT)
Dept: RHEUMATOLOGY | Facility: CLINIC | Age: 64
End: 2024-05-09
Payer: COMMERCIAL

## 2024-05-09 DIAGNOSIS — Z00.00 ENCOUNTER FOR GENERAL ADULT MEDICAL EXAMINATION W/OUT ABNORMAL FINDINGS: ICD-10-CM

## 2024-05-09 PROCEDURE — 99214 OFFICE O/P EST MOD 30 MIN: CPT

## 2024-05-09 NOTE — PHYSICAL EXAM
[General Appearance - Well Nourished] : well nourished [General Appearance - Well Developed] : well developed [Hearing Threshold Finger Rub Not [place]] : hearing was normal [Sclera] : the sclera and conjunctiva were normal [Affect] : the affect was normal [Mood] : the mood was normal [Nail Clubbing] : no clubbing  or cyanosis of the fingernails [Musculoskeletal - Swelling] : no joint swelling seen [Motor Tone] : muscle strength and tone were normal [FreeTextEntry1] : left wrist swelling improved, now he has FROM with no pain throught, monica no longer swollen

## 2024-05-09 NOTE — HISTORY OF PRESENT ILLNESS
[FreeTextEntry1] : patient lost to follow up for over 1 year   61 yo man with history of DM, HTN, HLD, alcohol use, rhinitis, psoriasis and PSA.  presents for management of psoriasis and PSA.  patient previously followed by DR Quinn James who was treating him with Remicade and MTX.  Patient last seen by Dr James was in 4/2021.    On last visit : Patient states that his psoriasis has worsen and he has significant joint pain and joint swelling.  his has prolong morning stiffness.  he does feel better when he becomes active and stays active .  however he sometimes finds it too difficulty to get himself started.  He has left foot sausage digits ( digit 3-5) , ankle swelling which makes it difficult to walk, left wrist swelling and cystic formation over the dorsum hand/wrist.    3/2023: patient started humira 1 month ago and feels much better.  he is also on MTX and he denies any alcohol use.  PATient states that skin is healing, and his joints feel much better.  he denies any back pain or cervical at this time.  No more sauage digits.  denies any eye issues, cough or sob.    5/9/2024: patient here after missing multiple appointments. last seen 4/2023. states that he had left the country.  patient was on humira and MTX with FA.  His PMD d/c MTX and patient can not tell me why.  His joints seem to be doing well with no swelling or morning stiffness.   His psoriasis at the ankles is active.  Unclear if this worsen after stopping MTX.  can not tell when he stopped enbrel  he denies any eye issues, oral lesion, GI issues.  No sob/cough or CP.    Labs:  quantiferon/hep serologies/HIV 3/9/2023 ESR 88 CRP 24  normal cbc/cmp negative RF/CCP/HLAB27/LEI normal CPK   PMH: as above surgery: appendectomy, rotator cuff surgery to bilateral shoulders  all: NKDA Meds;enalopril, rosuvastatin, cetirizine, methotrexate, piogliotazone  SH: From , retired use to work in a [employer] doing heavy lifting ,, No smoking or illicit drugs, ++alcohol on the weekends

## 2024-05-09 NOTE — ASSESSMENT
[FreeTextEntry1] : Patient with severe PSA and psoriasis previously treated with remicade and MTX.  started on HUmira and MTX but lost to f/u/.  He has continued the humira but MTX was d/c by PMD   --patient is to do labs today including screening  --will cont HUmira provided that labs look normal  --pending labs and f/u will consider MTX  --xrays on next visit  -psoriasis: patient to see dermatology.    --avoid alcohol

## 2024-05-29 LAB
ALBUMIN SERPL ELPH-MCNC: 4.4 G/DL
ALP BLD-CCNC: 109 U/L
ALT SERPL-CCNC: 15 U/L
ANION GAP SERPL CALC-SCNC: 11 MMOL/L
AST SERPL-CCNC: 20 U/L
BASOPHILS # BLD AUTO: 0.05 K/UL
BASOPHILS NFR BLD AUTO: 0.8 %
BILIRUB SERPL-MCNC: 0.4 MG/DL
BUN SERPL-MCNC: 12 MG/DL
CALCIUM SERPL-MCNC: 9.6 MG/DL
CHLORIDE SERPL-SCNC: 106 MMOL/L
CO2 SERPL-SCNC: 24 MMOL/L
CREAT SERPL-MCNC: 1.08 MG/DL
CRP SERPL-MCNC: <3 MG/L
EGFR: 77 ML/MIN/1.73M2
EOSINOPHIL # BLD AUTO: 0.31 K/UL
EOSINOPHIL NFR BLD AUTO: 5.2 %
ERYTHROCYTE [SEDIMENTATION RATE] IN BLOOD BY WESTERGREN METHOD: 2 MM/HR
GLUCOSE SERPL-MCNC: 133 MG/DL
HBV CORE IGG+IGM SER QL: NONREACTIVE
HBV SURFACE AB SER QL: NONREACTIVE
HBV SURFACE AG SER QL: NONREACTIVE
HCT VFR BLD CALC: 37.8 %
HCV AB SER QL: NONREACTIVE
HCV S/CO RATIO: 0.41 S/CO
HGB BLD-MCNC: 13.1 G/DL
HIV1+2 AB SPEC QL IA.RAPID: NONREACTIVE
IMM GRANULOCYTES NFR BLD AUTO: 0.2 %
LYMPHOCYTES # BLD AUTO: 1.82 K/UL
LYMPHOCYTES NFR BLD AUTO: 30.6 %
M TB IFN-G BLD-IMP: NEGATIVE
MAN DIFF?: NORMAL
MCHC RBC-ENTMCNC: 31 PG
MCHC RBC-ENTMCNC: 34.7 GM/DL
MCV RBC AUTO: 89.6 FL
MONOCYTES # BLD AUTO: 0.61 K/UL
MONOCYTES NFR BLD AUTO: 10.3 %
NEUTROPHILS # BLD AUTO: 3.14 K/UL
NEUTROPHILS NFR BLD AUTO: 52.9 %
PLATELET # BLD AUTO: 214 K/UL
POTASSIUM SERPL-SCNC: 4.7 MMOL/L
PROT SERPL-MCNC: 6.9 G/DL
QUANTIFERON TB PLUS MITOGEN MINUS NIL: 3.35 IU/ML
QUANTIFERON TB PLUS NIL: 0.03 IU/ML
QUANTIFERON TB PLUS TB1 MINUS NIL: 0 IU/ML
QUANTIFERON TB PLUS TB2 MINUS NIL: 0 IU/ML
RBC # BLD: 4.22 M/UL
RBC # FLD: 14.3 %
SODIUM SERPL-SCNC: 142 MMOL/L
WBC # FLD AUTO: 5.94 K/UL

## 2024-07-01 ENCOUNTER — APPOINTMENT (OUTPATIENT)
Dept: RHEUMATOLOGY | Facility: CLINIC | Age: 64
End: 2024-07-01
Payer: COMMERCIAL

## 2024-07-01 DIAGNOSIS — L40.50 ARTHROPATHIC PSORIASIS, UNSPECIFIED: ICD-10-CM

## 2024-07-01 PROCEDURE — 99214 OFFICE O/P EST MOD 30 MIN: CPT

## 2024-07-01 RX ORDER — ADALIMUMAB 40MG/0.4ML
40 KIT SUBCUTANEOUS
Qty: 1 | Refills: 5 | Status: ACTIVE | COMMUNITY
Start: 2024-07-01 | End: 1900-01-01

## 2024-07-22 ENCOUNTER — APPOINTMENT (OUTPATIENT)
Dept: RHEUMATOLOGY | Facility: CLINIC | Age: 64
End: 2024-07-22

## 2024-12-02 ENCOUNTER — APPOINTMENT (OUTPATIENT)
Dept: RHEUMATOLOGY | Facility: CLINIC | Age: 64
End: 2024-12-02

## 2025-02-28 ENCOUNTER — APPOINTMENT (OUTPATIENT)
Dept: RHEUMATOLOGY | Facility: CLINIC | Age: 65
End: 2025-02-28

## (undated) DEVICE — FORCEP ENDOJAW AGTR LC W NDL 2.8MM 230CM DISP